# Patient Record
Sex: MALE | Race: WHITE | NOT HISPANIC OR LATINO | URBAN - METROPOLITAN AREA
[De-identification: names, ages, dates, MRNs, and addresses within clinical notes are randomized per-mention and may not be internally consistent; named-entity substitution may affect disease eponyms.]

---

## 2019-11-25 ENCOUNTER — INPATIENT (INPATIENT)
Facility: HOSPITAL | Age: 49
LOS: 0 days | Discharge: HOME | DRG: 373 | End: 2019-11-26
Attending: SURGERY | Admitting: SURGERY
Payer: COMMERCIAL

## 2019-11-25 VITALS
WEIGHT: 154.98 LBS | RESPIRATION RATE: 20 BRPM | OXYGEN SATURATION: 98 % | HEIGHT: 69 IN | HEART RATE: 68 BPM | SYSTOLIC BLOOD PRESSURE: 130 MMHG | TEMPERATURE: 97 F | DIASTOLIC BLOOD PRESSURE: 77 MMHG

## 2019-11-25 DIAGNOSIS — K35.32 ACUTE APPENDICITIS WITH PERFORATION, LOCALIZED PERITONITIS, AND GANGRENE, WITHOUT ABSCESS: ICD-10-CM

## 2019-11-25 LAB
ALBUMIN SERPL ELPH-MCNC: 2.9 G/DL — LOW (ref 3.5–5)
ALP SERPL-CCNC: 66 U/L — SIGNIFICANT CHANGE UP (ref 40–120)
ALT FLD-CCNC: 30 U/L DA — SIGNIFICANT CHANGE UP (ref 10–60)
ANION GAP SERPL CALC-SCNC: 9 MMOL/L — SIGNIFICANT CHANGE UP (ref 5–17)
APTT BLD: 31.1 SEC — SIGNIFICANT CHANGE UP (ref 27.5–36.3)
AST SERPL-CCNC: 20 U/L — SIGNIFICANT CHANGE UP (ref 10–40)
BASOPHILS # BLD AUTO: 0.07 K/UL — SIGNIFICANT CHANGE UP (ref 0–0.2)
BASOPHILS NFR BLD AUTO: 0.7 % — SIGNIFICANT CHANGE UP (ref 0–2)
BILIRUB SERPL-MCNC: 0.4 MG/DL — SIGNIFICANT CHANGE UP (ref 0.2–1.2)
BUN SERPL-MCNC: 22 MG/DL — HIGH (ref 7–18)
CALCIUM SERPL-MCNC: 8.3 MG/DL — LOW (ref 8.4–10.5)
CHLORIDE SERPL-SCNC: 100 MMOL/L — SIGNIFICANT CHANGE UP (ref 96–108)
CO2 SERPL-SCNC: 28 MMOL/L — SIGNIFICANT CHANGE UP (ref 22–31)
CREAT SERPL-MCNC: 1.45 MG/DL — HIGH (ref 0.5–1.3)
EOSINOPHIL # BLD AUTO: 0.12 K/UL — SIGNIFICANT CHANGE UP (ref 0–0.5)
EOSINOPHIL NFR BLD AUTO: 1.2 % — SIGNIFICANT CHANGE UP (ref 0–6)
GLUCOSE SERPL-MCNC: 96 MG/DL — SIGNIFICANT CHANGE UP (ref 70–99)
HCT VFR BLD CALC: 41.5 % — SIGNIFICANT CHANGE UP (ref 39–50)
HGB BLD-MCNC: 13.8 G/DL — SIGNIFICANT CHANGE UP (ref 13–17)
IMM GRANULOCYTES NFR BLD AUTO: 7.4 % — HIGH (ref 0–1.5)
INR BLD: 1.12 RATIO — SIGNIFICANT CHANGE UP (ref 0.88–1.16)
LYMPHOCYTES # BLD AUTO: 1.52 K/UL — SIGNIFICANT CHANGE UP (ref 1–3.3)
LYMPHOCYTES # BLD AUTO: 15.6 % — SIGNIFICANT CHANGE UP (ref 13–44)
MCHC RBC-ENTMCNC: 30.9 PG — SIGNIFICANT CHANGE UP (ref 27–34)
MCHC RBC-ENTMCNC: 33.3 GM/DL — SIGNIFICANT CHANGE UP (ref 32–36)
MCV RBC AUTO: 93 FL — SIGNIFICANT CHANGE UP (ref 80–100)
MONOCYTES # BLD AUTO: 0.75 K/UL — SIGNIFICANT CHANGE UP (ref 0–0.9)
MONOCYTES NFR BLD AUTO: 7.7 % — SIGNIFICANT CHANGE UP (ref 2–14)
NEUTROPHILS # BLD AUTO: 6.57 K/UL — SIGNIFICANT CHANGE UP (ref 1.8–7.4)
NEUTROPHILS NFR BLD AUTO: 67.4 % — SIGNIFICANT CHANGE UP (ref 43–77)
NRBC # BLD: 0 /100 WBCS — SIGNIFICANT CHANGE UP (ref 0–0)
PLATELET # BLD AUTO: 432 K/UL — HIGH (ref 150–400)
POTASSIUM SERPL-MCNC: 4.1 MMOL/L — SIGNIFICANT CHANGE UP (ref 3.5–5.3)
POTASSIUM SERPL-SCNC: 4.1 MMOL/L — SIGNIFICANT CHANGE UP (ref 3.5–5.3)
PROT SERPL-MCNC: 7 G/DL — SIGNIFICANT CHANGE UP (ref 6–8.3)
PROTHROM AB SERPL-ACNC: 12.5 SEC — SIGNIFICANT CHANGE UP (ref 10–12.9)
RBC # BLD: 4.46 M/UL — SIGNIFICANT CHANGE UP (ref 4.2–5.8)
RBC # FLD: 12.7 % — SIGNIFICANT CHANGE UP (ref 10.3–14.5)
SODIUM SERPL-SCNC: 137 MMOL/L — SIGNIFICANT CHANGE UP (ref 135–145)
WBC # BLD: 9.75 K/UL — SIGNIFICANT CHANGE UP (ref 3.8–10.5)
WBC # FLD AUTO: 9.75 K/UL — SIGNIFICANT CHANGE UP (ref 3.8–10.5)

## 2019-11-25 RX ORDER — SODIUM CHLORIDE 9 MG/ML
1000 INJECTION, SOLUTION INTRAVENOUS
Refills: 0 | Status: DISCONTINUED | OUTPATIENT
Start: 2019-11-25 | End: 2019-11-26

## 2019-11-25 RX ORDER — CEFTRIAXONE 500 MG/1
1000 INJECTION, POWDER, FOR SOLUTION INTRAMUSCULAR; INTRAVENOUS ONCE
Refills: 0 | Status: DISCONTINUED | OUTPATIENT
Start: 2019-11-25 | End: 2019-11-26

## 2019-11-25 RX ORDER — METRONIDAZOLE 500 MG
TABLET ORAL
Refills: 0 | Status: DISCONTINUED | OUTPATIENT
Start: 2019-11-25 | End: 2019-11-26

## 2019-11-25 RX ORDER — CEFTRIAXONE 500 MG/1
INJECTION, POWDER, FOR SOLUTION INTRAMUSCULAR; INTRAVENOUS
Refills: 0 | Status: DISCONTINUED | OUTPATIENT
Start: 2019-11-25 | End: 2019-11-26

## 2019-11-25 RX ORDER — METRONIDAZOLE 500 MG
500 TABLET ORAL ONCE
Refills: 0 | Status: COMPLETED | OUTPATIENT
Start: 2019-11-25 | End: 2019-11-25

## 2019-11-25 RX ORDER — METRONIDAZOLE 500 MG
500 TABLET ORAL EVERY 8 HOURS
Refills: 0 | Status: DISCONTINUED | OUTPATIENT
Start: 2019-11-26 | End: 2019-11-26

## 2019-11-25 RX ORDER — SENNA PLUS 8.6 MG/1
2 TABLET ORAL AT BEDTIME
Refills: 0 | Status: DISCONTINUED | OUTPATIENT
Start: 2019-11-25 | End: 2019-11-26

## 2019-11-25 RX ORDER — CEFTRIAXONE 500 MG/1
1000 INJECTION, POWDER, FOR SOLUTION INTRAMUSCULAR; INTRAVENOUS ONCE
Refills: 0 | Status: COMPLETED | OUTPATIENT
Start: 2019-11-25 | End: 2019-11-25

## 2019-11-25 RX ORDER — CIPROFLOXACIN LACTATE 400MG/40ML
400 VIAL (ML) INTRAVENOUS ONCE
Refills: 0 | Status: COMPLETED | OUTPATIENT
Start: 2019-11-25 | End: 2019-11-25

## 2019-11-25 RX ORDER — HEPARIN SODIUM 5000 [USP'U]/ML
5000 INJECTION INTRAVENOUS; SUBCUTANEOUS EVERY 8 HOURS
Refills: 0 | Status: DISCONTINUED | OUTPATIENT
Start: 2019-11-25 | End: 2019-11-26

## 2019-11-25 RX ORDER — INFLUENZA VIRUS VACCINE 15; 15; 15; 15 UG/.5ML; UG/.5ML; UG/.5ML; UG/.5ML
0.5 SUSPENSION INTRAMUSCULAR ONCE
Refills: 0 | Status: DISCONTINUED | OUTPATIENT
Start: 2019-11-25 | End: 2019-11-26

## 2019-11-25 RX ADMIN — Medication 200 MILLIGRAM(S): at 19:31

## 2019-11-25 RX ADMIN — Medication 100 MILLIGRAM(S): at 19:31

## 2019-11-25 RX ADMIN — CEFTRIAXONE 100 MILLIGRAM(S): 500 INJECTION, POWDER, FOR SOLUTION INTRAMUSCULAR; INTRAVENOUS at 21:10

## 2019-11-25 RX ADMIN — SODIUM CHLORIDE 125 MILLILITER(S): 9 INJECTION, SOLUTION INTRAVENOUS at 22:45

## 2019-11-25 RX ADMIN — HEPARIN SODIUM 5000 UNIT(S): 5000 INJECTION INTRAVENOUS; SUBCUTANEOUS at 20:17

## 2019-11-25 NOTE — PATIENT PROFILE ADULT - HAVE YOU EXPERIENCED VIOLENCE OR A TRAUMATIC EVENT?
no weight loss/no homicidal/no agitation/no weakness/no suicidal/no change in level of consciousness/no paranoia/no confusion/no hallucinations/no disorientation
no

## 2019-11-25 NOTE — ED ADULT NURSE NOTE - OBJECTIVE STATEMENT
pt  48 y/o male with c/o RUQ abdominal pain s/p lifting a heavy   thing at work today pt alert oriented x3 denies any Nausea and vomiting

## 2019-11-25 NOTE — H&P ADULT - HISTORY OF PRESENT ILLNESS
49 yoM with no PMH or PSH presented to ED with outpatient CT findings c/w perforated appendicitis with a collection.   Per note, pt experienced acute onset of pain associated with nausea and fever last Tuesday, visited worker's compensation clinic where he was diagnosed with pulled muscle, but experienced continued fever and "swelling" on RLQ, although no pain or anorexia, warranting a visit to PCP, who had concerns for possible herniation, ordered a CT of a/p. It showed a fluid collection near cecum with appendix not visualized, c/w perforated appendicitis.     In ED, pt remains afebrile, free of pain, denies n/v/f/c.

## 2019-11-25 NOTE — H&P ADULT - NSHPLABSRESULTS_GEN_ALL_CORE
Vital Signs Last 24 Hrs  T(C): 36.3 (25 Nov 2019 16:06), Max: 36.3 (25 Nov 2019 16:06)  T(F): 97.4 (25 Nov 2019 16:06), Max: 97.4 (25 Nov 2019 16:06)  HR: 68 (25 Nov 2019 16:06) (68 - 68)  BP: 130/77 (25 Nov 2019 16:06) (130/77 - 130/77)  RR: 20 (25 Nov 2019 16:06) (20 - 20)  SpO2: 98% (25 Nov 2019 16:06) (98% - 98%)                          13.8   9.75  )-----------( 432      ( 25 Nov 2019 18:17 )             41.5     11-25    137  |  100  |  22<H>  ----------------------------<  96  4.1   |  28  |  1.45<H>    Ca    8.3<L>      25 Nov 2019 18:17    TPro  7.0  /  Alb  2.9<L>  /  TBili  0.4  /  DBili  x   /  AST  20  /  ALT  30  /  AlkPhos  66  11-25

## 2019-11-25 NOTE — H&P ADULT - ASSESSMENT
49 yoM with perforated appendicitis with fluid collection    afeb, no leuko, VSS    - admit to surgery for obs  - CLD for now  - IV abx  - f/u labs  - dvt ppx, oob, ambulate   - pain control if needed  - d/w attending

## 2019-11-25 NOTE — ED PROVIDER NOTE - PROGRESS NOTE DETAILS
CT report 2.2x 2.5x 4.8cm enhancing collection adjacent to cecim causing adjacent wall thickening, concerning for perforated appy

## 2019-11-25 NOTE — H&P ADULT - NSHPPHYSICALEXAM_GEN_ALL_CORE
General: alert and oriented, not in acute distress  Resp: breathing unlabored  Heart: regular rate and rhythm  Abdomen: soft, nondistended, minimal discomfort with deep palpation on RLQ  Extremities: no pedal edema

## 2019-11-25 NOTE — ED PROVIDER NOTE - CLINICAL SUMMARY MEDICAL DECISION MAKING FREE TEXT BOX
Patient presenting with abd pain and CAT scan that showed 2.2 cm collection. Awaiting for faxed report of CAT scan. Call surgery, order labs, and reassess.

## 2019-11-25 NOTE — ED PROVIDER NOTE - OBJECTIVE STATEMENT
49 year old male sent in by doctor for suspicion for perforated appendicitis on outpatient CAT scan today. Patient reports that several days ago after lifting something heavy, he began to feel severe right sided abd pain as well as subjective fevers and chills. The pain mostly subsided, though he is still feeling some discomfort. Today he went to outpatient CAT scan with PO and shortly after was called and told that he has an abscess in his appendix and that he needs to go to the ER right away. Patient denies nausea, vomiting, diarrhea, or any other acute complaints. Patient has not had any prior surgeries. NKDA.

## 2019-11-25 NOTE — ED PROVIDER NOTE - CONTEXT
Today, patient went to out outpatient to get a CAT scan which showed that he has an abscess on his appendix.

## 2019-11-26 VITALS
RESPIRATION RATE: 18 BRPM | DIASTOLIC BLOOD PRESSURE: 66 MMHG | HEART RATE: 67 BPM | SYSTOLIC BLOOD PRESSURE: 115 MMHG | TEMPERATURE: 98 F | OXYGEN SATURATION: 97 %

## 2019-11-26 LAB
ANION GAP SERPL CALC-SCNC: 6 MMOL/L — SIGNIFICANT CHANGE UP (ref 5–17)
BUN SERPL-MCNC: 19 MG/DL — HIGH (ref 7–18)
CALCIUM SERPL-MCNC: 8.4 MG/DL — SIGNIFICANT CHANGE UP (ref 8.4–10.5)
CHLORIDE SERPL-SCNC: 103 MMOL/L — SIGNIFICANT CHANGE UP (ref 96–108)
CO2 SERPL-SCNC: 30 MMOL/L — SIGNIFICANT CHANGE UP (ref 22–31)
CREAT SERPL-MCNC: 1.04 MG/DL — SIGNIFICANT CHANGE UP (ref 0.5–1.3)
GLUCOSE SERPL-MCNC: 114 MG/DL — HIGH (ref 70–99)
HCT VFR BLD CALC: 41.9 % — SIGNIFICANT CHANGE UP (ref 39–50)
HGB BLD-MCNC: 13.7 G/DL — SIGNIFICANT CHANGE UP (ref 13–17)
MCHC RBC-ENTMCNC: 30.8 PG — SIGNIFICANT CHANGE UP (ref 27–34)
MCHC RBC-ENTMCNC: 32.7 GM/DL — SIGNIFICANT CHANGE UP (ref 32–36)
MCV RBC AUTO: 94.2 FL — SIGNIFICANT CHANGE UP (ref 80–100)
NRBC # BLD: 0 /100 WBCS — SIGNIFICANT CHANGE UP (ref 0–0)
PLATELET # BLD AUTO: 373 K/UL — SIGNIFICANT CHANGE UP (ref 150–400)
POTASSIUM SERPL-MCNC: 4 MMOL/L — SIGNIFICANT CHANGE UP (ref 3.5–5.3)
POTASSIUM SERPL-SCNC: 4 MMOL/L — SIGNIFICANT CHANGE UP (ref 3.5–5.3)
RBC # BLD: 4.45 M/UL — SIGNIFICANT CHANGE UP (ref 4.2–5.8)
RBC # FLD: 12.6 % — SIGNIFICANT CHANGE UP (ref 10.3–14.5)
SODIUM SERPL-SCNC: 139 MMOL/L — SIGNIFICANT CHANGE UP (ref 135–145)
WBC # BLD: 7.88 K/UL — SIGNIFICANT CHANGE UP (ref 3.8–10.5)
WBC # FLD AUTO: 7.88 K/UL — SIGNIFICANT CHANGE UP (ref 3.8–10.5)

## 2019-11-26 RX ORDER — METRONIDAZOLE 500 MG
1 TABLET ORAL
Qty: 42 | Refills: 0
Start: 2019-11-26 | End: 2019-12-09

## 2019-11-26 RX ORDER — CEFUROXIME AXETIL 250 MG
1 TABLET ORAL
Qty: 28 | Refills: 0
Start: 2019-11-26 | End: 2019-12-09

## 2019-11-26 RX ADMIN — Medication 100 MILLIGRAM(S): at 05:35

## 2019-11-26 NOTE — DISCHARGE NOTE PROVIDER - NSDCCPCAREPLAN_GEN_ALL_CORE_FT
PRINCIPAL DISCHARGE DIAGNOSIS  Diagnosis: Perforated appendicitis  Assessment and Plan of Treatment: Please continue oral antibiotics for 2 weeks  Please follow up with Dr. Avila within 1 week for interval appendectomy

## 2019-11-26 NOTE — DISCHARGE NOTE PROVIDER - NSDCMRMEDTOKEN_GEN_ALL_CORE_FT
cefuroxime 500 mg oral tablet: 1 tab(s) orally every 12 hours   Flagyl 500 mg oral tablet: 1 tab(s) orally 3 times a day

## 2019-11-26 NOTE — PROGRESS NOTE ADULT - SUBJECTIVE AND OBJECTIVE BOX
SUBJECTIVE    Nausea [ ] YES [X ] NO  Vomiting [ ] YES [X ] NO  Voiding normally [X ] YES [ ] NO  Flatus [ X] YES [ ] NO  BM [X ] YES [ ] NO       Diarrhea [ ] YES [X ] NO  Pain under control [X ] YES [ ] NO  Tolerating clears  Ambulated [X ] YES NO [ ]  Using Incentive Spirometer [X ] YES [ ] NO      VITALS    ICU Vital Signs Last 24 Hrs  T(C): 36.6 (26 Nov 2019 05:10), Max: 36.9 (25 Nov 2019 21:59)  T(F): 97.9 (26 Nov 2019 05:10), Max: 98.4 (25 Nov 2019 21:59)  HR: 49 (26 Nov 2019 05:10) (49 - 71)  BP: 103/58 (26 Nov 2019 05:10) (103/58 - 130/77)  BP(mean): --  ABP: --  ABP(mean): --  RR: 18 (26 Nov 2019 05:10) (16 - 20)  SpO2: 97% (26 Nov 2019 05:10) (97% - 100%)    I&O's Detail        PHYSICAL EXAMINATION    Abdomen: soft, ND, tender to RLQ, with fullness on exam    I&O's Summary      LABS                        13.8   9.75  )-----------( 432      ( 25 Nov 2019 18:17 )             41.5             11-25    137  |  100  |  22<H>  ----------------------------<  96  4.1   |  28  |  1.45<H>    Ca    8.3<L>      25 Nov 2019 18:17    TPro  7.0  /  Alb  2.9<L>  /  TBili  0.4  /  DBili  x   /  AST  20  /  ALT  30  /  AlkPhos  66  11-25    LIVER FUNCTIONS - ( 25 Nov 2019 18:17 )  Alb: 2.9 g/dL / Pro: 7.0 g/dL / ALK PHOS: 66 U/L / ALT: 30 U/L DA / AST: 20 U/L / GGT: x             MEDICATIONS:  MEDICATIONS  (STANDING):  cefTRIAXone   IVPB      cefTRIAXone   IVPB 1000 milliGRAM(s) IV Intermittent Once  dextrose 5% + sodium chloride 0.45%. 1000 milliLiter(s) (125 mL/Hr) IV Continuous <Continuous>  heparin  Injectable 5000 Unit(s) SubCutaneous every 8 hours  influenza   Vaccine 0.5 milliLiter(s) IntraMuscular once  metroNIDAZOLE  IVPB 500 milliGRAM(s) IV Intermittent every 8 hours  metroNIDAZOLE  IVPB        MEDICATIONS  (PRN):  senna 2 Tablet(s) Oral at bedtime PRN Constipation

## 2019-11-26 NOTE — DISCHARGE NOTE PROVIDER - CARE PROVIDER_API CALL
Eugene Avila (MD)  Surgery  [address] [place] [address]  Phone: [phone]  Fax: [phone]  Follow Up Time: 1 week

## 2019-11-26 NOTE — DISCHARGE NOTE NURSING/CASE MANAGEMENT/SOCIAL WORK - PATIENT PORTAL LINK FT
You can access the FollowMyHealth Patient Portal offered by [facility] by registering at the following website: [email or web address]. By joining [employer]’s FollowMyHealth portal, you will also be able to view your health information using other applications (apps) compatible with our system.

## 2019-11-26 NOTE — PROGRESS NOTE ADULT - ASSESSMENT
49M with perforated appendicitis with small abscess. Too small to drain. Afebrile.    1) repeat labs  2) reg diet  3) DC on PO ceftin flagyl x 2 weeks

## 2019-11-26 NOTE — DISCHARGE NOTE PROVIDER - HOSPITAL COURSE
49 yoM with no PMH or PSH presented to ED with outpatient CT findings c/w perforated appendicitis with a collection.     Per note, pt experienced acute onset of pain associated with nausea and fever last Tuesday, visited worker's compensation clinic where he was diagnosed with pulled muscle, but experienced continued fever and "swelling" on RLQ, although no pain or anorexia, warranting a visit to PCP, who had concerns for possible herniation, ordered a CT of a/p. It showed a fluid collection near cecum with appendix not visualized, c/w perforated appendicitis.     Patient was admitted with perforated appendicitis. Pain improved, tolerated diet for d/c home with oral antibiotics

## 2019-12-04 DIAGNOSIS — K35.33 ACUTE APPENDICITIS WITH PERFORATION, LOCALIZED PERITONITIS, AND GANGRENE, WITH ABSCESS: ICD-10-CM

## 2021-04-23 NOTE — ED ADULT NURSE NOTE - PAIN RATING/NUMBER SCALE (0-10): REST
obese,normal bowel sounds , no masses palpable , no guarding or rigidity , soft, nontender, nondistended
7

## 2022-09-30 NOTE — ED PROVIDER NOTE - CONSTITUTIONAL [+], MLM
September 30, 2022    Hello, may I speak with Diallo Spence?    My name is Stacy      I am  with [facility]  [address] [facility] [address]  [place] [address] [phone].    Patient rounding questions sent through [employer].      Thank you, and have a great day.       subjective fever/CHILLS

## 2023-05-01 ENCOUNTER — EMERGENCY (EMERGENCY)
Facility: HOSPITAL | Age: 53
LOS: 1 days | Discharge: HOME | End: 2023-05-01
Attending: EMERGENCY MEDICINE | Admitting: EMERGENCY MEDICINE
Payer: COMMERCIAL

## 2023-05-01 VITALS
SYSTOLIC BLOOD PRESSURE: 135 MMHG | DIASTOLIC BLOOD PRESSURE: 79 MMHG | TEMPERATURE: 98 F | OXYGEN SATURATION: 100 % | HEART RATE: 67 BPM | RESPIRATION RATE: 16 BRPM

## 2023-05-01 VITALS
HEART RATE: 61 BPM | OXYGEN SATURATION: 100 % | DIASTOLIC BLOOD PRESSURE: 88 MMHG | RESPIRATION RATE: 18 BRPM | TEMPERATURE: 98 F | SYSTOLIC BLOOD PRESSURE: 152 MMHG

## 2023-05-01 LAB
ALBUMIN SERPL ELPH-MCNC: 4.2 G/DL — SIGNIFICANT CHANGE UP (ref 3.3–5)
ALP SERPL-CCNC: 69 U/L — SIGNIFICANT CHANGE UP (ref 40–120)
ALT FLD-CCNC: 45 U/L — HIGH (ref 4–41)
ANION GAP SERPL CALC-SCNC: 10 MMOL/L — SIGNIFICANT CHANGE UP (ref 7–14)
APPEARANCE UR: CLEAR — SIGNIFICANT CHANGE UP
APTT BLD: 32 SEC — SIGNIFICANT CHANGE UP (ref 27–36.3)
AST SERPL-CCNC: 32 U/L — SIGNIFICANT CHANGE UP (ref 4–40)
BASOPHILS # BLD AUTO: 0.03 K/UL — SIGNIFICANT CHANGE UP (ref 0–0.2)
BASOPHILS NFR BLD AUTO: 0.4 % — SIGNIFICANT CHANGE UP (ref 0–2)
BILIRUB SERPL-MCNC: 0.9 MG/DL — SIGNIFICANT CHANGE UP (ref 0.2–1.2)
BILIRUB UR-MCNC: NEGATIVE — SIGNIFICANT CHANGE UP
BLD GP AB SCN SERPL QL: NEGATIVE — SIGNIFICANT CHANGE UP
BUN SERPL-MCNC: 17 MG/DL — SIGNIFICANT CHANGE UP (ref 7–23)
CALCIUM SERPL-MCNC: 9.1 MG/DL — SIGNIFICANT CHANGE UP (ref 8.4–10.5)
CHLORIDE SERPL-SCNC: 104 MMOL/L — SIGNIFICANT CHANGE UP (ref 98–107)
CO2 SERPL-SCNC: 23 MMOL/L — SIGNIFICANT CHANGE UP (ref 22–31)
COLOR SPEC: YELLOW — SIGNIFICANT CHANGE UP
CREAT SERPL-MCNC: 1.09 MG/DL — SIGNIFICANT CHANGE UP (ref 0.5–1.3)
DIFF PNL FLD: NEGATIVE — SIGNIFICANT CHANGE UP
EGFR: 82 ML/MIN/1.73M2 — SIGNIFICANT CHANGE UP
EOSINOPHIL # BLD AUTO: 0.2 K/UL — SIGNIFICANT CHANGE UP (ref 0–0.5)
EOSINOPHIL NFR BLD AUTO: 2.8 % — SIGNIFICANT CHANGE UP (ref 0–6)
GLUCOSE SERPL-MCNC: 87 MG/DL — SIGNIFICANT CHANGE UP (ref 70–99)
GLUCOSE UR QL: NEGATIVE — SIGNIFICANT CHANGE UP
HCT VFR BLD CALC: 49.1 % — SIGNIFICANT CHANGE UP (ref 39–50)
HGB BLD-MCNC: 15.9 G/DL — SIGNIFICANT CHANGE UP (ref 13–17)
IANC: 4.65 K/UL — SIGNIFICANT CHANGE UP (ref 1.8–7.4)
IMM GRANULOCYTES NFR BLD AUTO: 0.6 % — SIGNIFICANT CHANGE UP (ref 0–0.9)
INR BLD: 0.99 RATIO — SIGNIFICANT CHANGE UP (ref 0.88–1.16)
KETONES UR-MCNC: NEGATIVE — SIGNIFICANT CHANGE UP
LEUKOCYTE ESTERASE UR-ACNC: NEGATIVE — SIGNIFICANT CHANGE UP
LYMPHOCYTES # BLD AUTO: 1.65 K/UL — SIGNIFICANT CHANGE UP (ref 1–3.3)
LYMPHOCYTES # BLD AUTO: 22.8 % — SIGNIFICANT CHANGE UP (ref 13–44)
MCHC RBC-ENTMCNC: 29.6 PG — SIGNIFICANT CHANGE UP (ref 27–34)
MCHC RBC-ENTMCNC: 32.4 GM/DL — SIGNIFICANT CHANGE UP (ref 32–36)
MCV RBC AUTO: 91.4 FL — SIGNIFICANT CHANGE UP (ref 80–100)
MONOCYTES # BLD AUTO: 0.67 K/UL — SIGNIFICANT CHANGE UP (ref 0–0.9)
MONOCYTES NFR BLD AUTO: 9.3 % — SIGNIFICANT CHANGE UP (ref 2–14)
NEUTROPHILS # BLD AUTO: 4.65 K/UL — SIGNIFICANT CHANGE UP (ref 1.8–7.4)
NEUTROPHILS NFR BLD AUTO: 64.1 % — SIGNIFICANT CHANGE UP (ref 43–77)
NITRITE UR-MCNC: NEGATIVE — SIGNIFICANT CHANGE UP
NRBC # BLD: 0 /100 WBCS — SIGNIFICANT CHANGE UP (ref 0–0)
NRBC # FLD: 0 K/UL — SIGNIFICANT CHANGE UP (ref 0–0)
PH UR: 6 — SIGNIFICANT CHANGE UP (ref 5–8)
PLATELET # BLD AUTO: 247 K/UL — SIGNIFICANT CHANGE UP (ref 150–400)
POTASSIUM SERPL-MCNC: 4.4 MMOL/L — SIGNIFICANT CHANGE UP (ref 3.5–5.3)
POTASSIUM SERPL-SCNC: 4.4 MMOL/L — SIGNIFICANT CHANGE UP (ref 3.5–5.3)
PROT SERPL-MCNC: 7 G/DL — SIGNIFICANT CHANGE UP (ref 6–8.3)
PROT UR-MCNC: ABNORMAL
PROTHROM AB SERPL-ACNC: 11.5 SEC — SIGNIFICANT CHANGE UP (ref 10.5–13.4)
RBC # BLD: 5.37 M/UL — SIGNIFICANT CHANGE UP (ref 4.2–5.8)
RBC # FLD: 12.2 % — SIGNIFICANT CHANGE UP (ref 10.3–14.5)
RH IG SCN BLD-IMP: NEGATIVE — SIGNIFICANT CHANGE UP
SODIUM SERPL-SCNC: 137 MMOL/L — SIGNIFICANT CHANGE UP (ref 135–145)
SP GR SPEC: >1.05 (ref 1.01–1.05)
UROBILINOGEN FLD QL: SIGNIFICANT CHANGE UP
WBC # BLD: 7.24 K/UL — SIGNIFICANT CHANGE UP (ref 3.8–10.5)
WBC # FLD AUTO: 7.24 K/UL — SIGNIFICANT CHANGE UP (ref 3.8–10.5)

## 2023-05-01 NOTE — ED ADULT TRIAGE NOTE - CHIEF COMPLAINT QUOTE
Pt reporting to the ED for RLQ pain since friday. Reports pmh of appendicitis with no removal due to covid, treated with ABX in 2019. Reports pain feels similar.

## 2023-05-01 NOTE — ED PROVIDER NOTE - OBJECTIVE STATEMENT
52-year-old male with no stated past medical history presenting to the ER with right lower quadrant pain x3 days.  Patient states in 2019 he was diagnosed with appendicitis, at the time was told it was either perforated or there was a cyst in the region, he was treated with IV and then p.o. antibiotics.  Patient states ever since then he intermittently feels pain to the right lower quadrant.  Patient states for the past 3 days the pain has been more constant and severe.  Patient also is having pain in the right low back and at times down the front of the right leg. Patient denies fever/chills, nausea, vomiting, diarrhea, CP, SOB, dysuria, hematuria, urinary frequency or any other concerns.

## 2023-05-01 NOTE — ED PROVIDER NOTE - PROGRESS NOTE DETAILS
ERNESTINA Montes De Oca: Discussed CT read with radiology, no radiologic evidence of appendicitis. UA without evidence of infection. Discussed with pt he needs to f/u with GI for colonoscopy, should also have outpt MRI of low back. Pt offered and declined analgesia. Pt has a PMD to follow up with, he will return to the ER with any worsening or concerning symptoms.

## 2023-05-01 NOTE — ED PROVIDER NOTE - PATIENT PORTAL LINK FT
You can access the FollowMyHealth Patient Portal offered by [facility] by registering at the following website: [email or web address]. By joining [employer]’s FollowMyHealth portal, you will also be able to view your health information using other applications (apps) compatible with our system.

## 2023-05-01 NOTE — ED PROVIDER NOTE - CLINICAL SUMMARY MEDICAL DECISION MAKING FREE TEXT BOX
52-year-old male with no stated past medical history presenting to the ER with right lower quadrant pain x3 days.  Patient states in 2019 he was diagnosed with appendicitis, at the time was told it was either perforated or there was a cyst in the region, he was treated with IV and then p.o. antibiotics.  Patient states ever since then he intermittently feels pain to the right lower quadrant.  Patient states for the past 3 days the pain has been more constant and severe.  Patient also is having pain in the right low back and at times down the front of the right leg. On exam patient is well-appearing, afebrile, minimal right lower quadrant tenderness, no CVA tenderness, no palpable inguinal hernia.  Concern for appendicitis, MSK type pain.  Plan: CBC/CMP, preop, CT abdomen and pelvis.  Patient offered and refused analgesia at this time.

## 2023-05-01 NOTE — ED PROVIDER NOTE - NS ED ATTENDING STATEMENT MOD
Attending with This was a shared visit with the ANKITA. I reviewed and verified the documentation and independently performed the documented:

## 2023-05-01 NOTE — ED PROVIDER NOTE - NSFOLLOWUPINSTRUCTIONS_ED_ALL_ED_FT
Follow-up with your primary care doctor within 1 week.  Follow-up with a gastroenterologist within 1-2 weeks, referral list attached please call to make an appointment, discuss having colonoscopy performed.  Follow-up with the spine center, discuss having an outpatient MRI performed of your low back.  Take ibuprofen 600 mg every 6-8 hours as needed for pain, take with food.  – You can also take Tylenol 650 mg every 6 hours as needed for pain return to the ER with any worsening or concerning symptoms increased pain, changes to the skin in the area, vomiting, fever/chills, diarrhea or any other concerns.

## 2023-05-01 NOTE — ED PROVIDER NOTE - ATTENDING CONTRIBUTION TO CARE
Brief HPI:  52-year-old male past medical history perforated appendicitis managed operatively in 2019 presents with right lower quadrant pain for 3 days.  Patient states pain radiates to right flank, right groin, intermittent, worse when having bowel movements, and worse when rotating torso.  Patient states that pain also radiates to the anterior thigh.  She denies fever, chills, nausea, vomiting, diarrhea, bloody or dark stool, dysuria, hematuria, numbness, tingling, weakness.    Vitals:   Reviewed    Exam:    GEN:  Non-toxic appearing, non-distressed, speaking full sentences, non-diaphoretic, AAOx3  HEENT:  NCAT, neck supple, EOMI, PERRLA, sclera anicteric, no conjunctival pallor or injection, no stridor, normal voice, no tonsillar exudate, uvula midline  CV:  regular rhythm and rate, s1/s2 audible, no murmurs, rubs or gallops, peripheral pulses 2+ and symmetric  PULM:  non-labored respirations, lungs clear to auscultation bilaterally, no wheezes, crackles or rales  ABD:  non distended, Mild tender in right lower quadrant, no rebound, no guarding, negative Melvin's sign, bowel sounds normal, no cvat, Inguinal region without hernia  MSK:  no gross deformity, non-tender extremities and joints, range of motion grossly normal appearing, no extremity edema, extremities warm and well perfused   NEURO:  AAOx3, CN II-XII intact, motor 5/5 in upper and lower extremities bilaterally, sensation grossly intact in extremities and trunk, finger to nose testing wnl, no nystagmus, negative Romberg, no pronator drift, no gait deficit  SKIN:  warm, dry, no rash or vesicles     A/P:  52-year-old male past medical history perforated appendicitis managed operatively in 2019 presents with right lower quadrant pain for 3 days.  Vital signs stable.  Exam nontoxic-appearing, mild right lower quadrant tenderness on exam, nonperitoneal.  Possible recurrent appendectomy given area of pain.  Kidney stone or musculoskeletal etiology also considered.  Will send labs, CT abdomen pelvis, supportive care.  Disposition pending.

## 2023-05-01 NOTE — ED ADULT NURSE NOTE - OBJECTIVE STATEMENT
Received the patient from intake area after initial work up. Patient came in with c/o RLQ abdominal pain since friday. not in acute dsitress. A+O x 4, safety maintained. Patient await Discharge papers  and instructions

## 2023-05-01 NOTE — ED ADULT NURSE REASSESSMENT NOTE - NS ED NURSE REASSESS COMMENT FT1
Resting comfortably on stretcher. denies any pain, Not in acute distress. alert and oriented x 4, safety maintained. VSS. Resps are even and non labored.

## 2023-05-03 LAB
CULTURE RESULTS: SIGNIFICANT CHANGE UP
SPECIMEN SOURCE: SIGNIFICANT CHANGE UP

## 2023-07-04 PROBLEM — Z78.9 OTHER SPECIFIED HEALTH STATUS: Chronic | Status: ACTIVE | Noted: 2019-11-25
